# Patient Record
(demographics unavailable — no encounter records)

---

## 2025-06-20 NOTE — PHYSICAL EXAM
[de-identified] : NAD. BMI 42.2 [de-identified] : Normal respiratory effort. [de-identified] : Breast exam was performed with a medical assistant chaperone present (FB). No dominant masses, skin changes, nipple retraction, or palpable axillary lymphadenopathy. SN->N distance is 34 cm on the left and 33.5 cm on the right; width is 20 cm on the left and 20.5 cm on the right; N->IMF is 18 cm on the left and 18 cm on the right. Chest circumference is approximately 103 cm. There is bilateral grade 3 ptosis.

## 2025-06-20 NOTE — ASSESSMENT
[FreeTextEntry1] : The patient's goals would likely best be achieved by a double incision free nipple graft top surgery technique with chest wall liposuction.  He is interested in reinnervation of the NAC area if possible. He expresses understanding of the risks associated with surgery. To proceed, he will need 1-2 letters of assessment depending his insurance.  He is also referred for breast ultrasound and medical genetics given his mother's breast cancer and Ashkenazi heritage.

## 2025-06-20 NOTE — PHYSICAL EXAM
[de-identified] : NAD. BMI 42.2 [de-identified] : Normal respiratory effort. [de-identified] : Breast exam was performed with a medical assistant chaperone present (FB). No dominant masses, skin changes, nipple retraction, or palpable axillary lymphadenopathy. SN->N distance is 34 cm on the left and 33.5 cm on the right; width is 20 cm on the left and 20.5 cm on the right; N->IMF is 18 cm on the left and 18 cm on the right. Chest circumference is approximately 103 cm. There is bilateral grade 3 ptosis.

## 2025-06-20 NOTE — HISTORY OF PRESENT ILLNESS
[FreeTextEntry1] : 20yo transgender male designated female at birth (Nelly; he/him) presents for consultation for top surgery. States he wants his chest "flat, masc". Patient has been on T for a year. Currently binds with a binder rarely, no tape. Denies any recent lumps/bumps, or other breast changes. No history of breast imaging. Denies any family history of melanoma, breast, ovarian, or pancreatic cancer. Denies any family history of DVT/clots. Denies any personal or family history of issues with general anesthesia. States that nipple sensation is somewhat important (3/5 of importance). No plans to ever breast feed and expresses understanding that this procedure would render the patient unable to do so.  Patient is in school for animation and illustration. Lives with supportive family half the year who he will be staying with for recovery.  His mother, Aydee, has accompanied him today.  The other part of the year he lives with friends in a dorm. Endorses marijuana edible use. The patient agreed to avoid all marijuana smoking for 6 weeks before surgery and 6 weeks after surgery and to avoid all THC use for 2 weeks before and after surgery. Denies tobacco, alcohol, and any other recreational drug use. Patient denies any history of psychiatric hospitalization or ER admission.

## 2025-06-20 NOTE — HISTORY OF PRESENT ILLNESS
[FreeTextEntry1] : 18yo transgender male designated female at birth (Nelly; he/him) presents for consultation for top surgery. States he wants his chest "flat, masc". Patient has been on T for a year. Currently binds with a binder rarely, no tape. Denies any recent lumps/bumps, or other breast changes. No history of breast imaging. Denies any family history of melanoma, breast, ovarian, or pancreatic cancer. Denies any family history of DVT/clots. Denies any personal or family history of issues with general anesthesia. States that nipple sensation is somewhat important (3/5 of importance). No plans to ever breast feed and expresses understanding that this procedure would render the patient unable to do so.  Patient is in school for animation and illustration. Lives with supportive family half the year who he will be staying with for recovery.  His mother, Aydee, has accompanied him today.  The other part of the year he lives with friends in a dorm. Endorses marijuana edible use. The patient agreed to avoid all marijuana smoking for 6 weeks before surgery and 6 weeks after surgery and to avoid all THC use for 2 weeks before and after surgery. Denies tobacco, alcohol, and any other recreational drug use. Patient denies any history of psychiatric hospitalization or ER admission.